# Patient Record
Sex: MALE | Race: WHITE | Employment: UNEMPLOYED | URBAN - METROPOLITAN AREA
[De-identification: names, ages, dates, MRNs, and addresses within clinical notes are randomized per-mention and may not be internally consistent; named-entity substitution may affect disease eponyms.]

---

## 2020-08-01 ENCOUNTER — APPOINTMENT (OUTPATIENT)
Dept: CT IMAGING | Age: 20
End: 2020-08-01
Payer: COMMERCIAL

## 2020-08-01 ENCOUNTER — HOSPITAL ENCOUNTER (EMERGENCY)
Age: 20
Discharge: HOME | End: 2020-08-01
Payer: COMMERCIAL

## 2020-08-01 VITALS
OXYGEN SATURATION: 100 % | HEART RATE: 95 BPM | HEIGHT: 70 IN | BODY MASS INDEX: 26.48 KG/M2 | RESPIRATION RATE: 17 BRPM | WEIGHT: 185 LBS | SYSTOLIC BLOOD PRESSURE: 139 MMHG | TEMPERATURE: 98.3 F | DIASTOLIC BLOOD PRESSURE: 96 MMHG

## 2020-08-01 PROCEDURE — 70450 CT HEAD/BRAIN W/O DYE: CPT

## 2020-08-01 PROCEDURE — 70480 CT ORBIT/EAR/FOSSA W/O DYE: CPT

## 2020-08-01 PROCEDURE — 12013 RPR F/E/E/N/L/M 2.6-5.0 CM: CPT

## 2020-08-01 PROCEDURE — 99283 EMERGENCY DEPT VISIT LOW MDM: CPT

## 2020-08-01 PROCEDURE — 90471 IMMUNIZATION ADMIN: CPT | Performed by: PHYSICIAN ASSISTANT

## 2020-08-01 PROCEDURE — 90715 TDAP VACCINE 7 YRS/> IM: CPT | Performed by: PHYSICIAN ASSISTANT

## 2020-08-01 PROCEDURE — 6360000002 HC RX W HCPCS: Performed by: PHYSICIAN ASSISTANT

## 2020-08-01 RX ORDER — LIDOCAINE HYDROCHLORIDE AND EPINEPHRINE BITARTRATE 20; .01 MG/ML; MG/ML
20 INJECTION, SOLUTION SUBCUTANEOUS ONCE
Status: DISCONTINUED | OUTPATIENT
Start: 2020-08-01 | End: 2020-08-01 | Stop reason: HOSPADM

## 2020-08-01 RX ORDER — HYDROCODONE BITARTRATE AND ACETAMINOPHEN 5; 325 MG/1; MG/1
1 TABLET ORAL EVERY 6 HOURS PRN
Qty: 15 TABLET | Refills: 0 | Status: SHIPPED | OUTPATIENT
Start: 2020-08-01 | End: 2020-08-04

## 2020-08-01 RX ORDER — AMOXICILLIN AND CLAVULANATE POTASSIUM 875; 125 MG/1; MG/1
1 TABLET, FILM COATED ORAL 2 TIMES DAILY
Qty: 20 TABLET | Refills: 0 | Status: SHIPPED | OUTPATIENT
Start: 2020-08-01 | End: 2020-08-11

## 2020-08-01 RX ADMIN — TETANUS TOXOID, REDUCED DIPHTHERIA TOXOID AND ACELLULAR PERTUSSIS VACCINE, ADSORBED 0.5 ML: 5; 2.5; 8; 8; 2.5 SUSPENSION INTRAMUSCULAR at 20:33

## 2020-08-01 ASSESSMENT — ENCOUNTER SYMPTOMS
NAUSEA: 0
ABDOMINAL PAIN: 0
VOMITING: 0
FACIAL SWELLING: 1
EYE PAIN: 0
CHEST TIGHTNESS: 0
SHORTNESS OF BREATH: 0
COLOR CHANGE: 1
DIARRHEA: 0

## 2020-08-01 ASSESSMENT — VISUAL ACUITY: OU: 1

## 2020-08-01 ASSESSMENT — PAIN DESCRIPTION - PAIN TYPE: TYPE: ACUTE PAIN

## 2020-08-01 ASSESSMENT — PAIN SCALES - GENERAL: PAINLEVEL_OUTOF10: 6

## 2020-08-01 NOTE — ED PROVIDER NOTES
Laceration present. No raccoon eyes, Dawson's sign or contusion. Jaw: There is normal jaw occlusion. Right Ear: Hearing, tympanic membrane, ear canal and external ear normal. No hemotympanum. Left Ear: Hearing, tympanic membrane, ear canal and external ear normal. No hemotympanum. Nose: Nose normal.      Mouth/Throat:      Lips: Pink. Mouth: Mucous membranes are moist.   Eyes:      General: Vision grossly intact. Gaze aligned appropriately. No scleral icterus. Right eye: No discharge. Left eye: No discharge. Extraocular Movements: Extraocular movements intact. Right eye: Normal extraocular motion and no nystagmus. Left eye: Normal extraocular motion and no nystagmus. Conjunctiva/sclera: Conjunctivae normal.      Left eye: Left conjunctiva is not injected. No chemosis, exudate or hemorrhage. Pupils: Pupils are equal, round, and reactive to light. Neck:      Musculoskeletal: Normal range of motion. Vascular: No JVD. Cardiovascular:      Rate and Rhythm: Normal rate and regular rhythm. Heart sounds: No murmur. No friction rub. No gallop. Pulmonary:      Effort: Pulmonary effort is normal. No accessory muscle usage or respiratory distress. Breath sounds: Normal breath sounds. No wheezing, rhonchi or rales. Abdominal:      General: There is no distension. Palpations: Abdomen is soft. Abdomen is not rigid. There is no mass. Tenderness: There is no abdominal tenderness. There is no guarding or rebound. Skin:     General: Skin is warm and dry. Neurological:      Mental Status: He is alert and oriented to person, place, and time. GCS: GCS eye subscore is 4. GCS verbal subscore is 5. GCS motor subscore is 6. Cranial Nerves: No cranial nerve deficit. Sensory: No sensory deficit. Coordination: Coordination normal.   Psychiatric:         Behavior: Behavior normal. Behavior is cooperative.          DIAGNOSTIC RESULTS   LABS:    Labs Reviewed - No data to display    All other labs were within normal range or not returned as of this dictation. EKG: All EKG's are interpreted by the Emergency Department Physician in the absence of a cardiologist.  Please see their note for interpretation of EKG. RADIOLOGY:   Non-plain film images such as CT, Ultrasound and MRI are read by the radiologist. Plain radiographic images are visualized and preliminarily interpreted by the ED Provider with the below findings:        Interpretation per the Radiologist below, if available at the time of this note:    CT HEAD WO CONTRAST   Final Result   1. No acute intracranial abnormality. 2. There is suggestion of a nondepressed fracture involving the left orbital   floor given minimal soft tissue emphysema within the left inferior extraconal   region as well as blood layering within the left maxillary sinus. 3. Mild-to-moderate left perioral soft tissue swelling with a laceration   along the left frontal scalp. CT ORBITS WO CONTRAST   Final Result   1. No acute intracranial abnormality. 2. There is suggestion of a nondepressed fracture involving the left orbital   floor given minimal soft tissue emphysema within the left inferior extraconal   region as well as blood layering within the left maxillary sinus. 3. Mild-to-moderate left perioral soft tissue swelling with a laceration   along the left frontal scalp. PROCEDURES   Unless otherwise noted below, none     Lac Repair    Date/Time: 8/1/2020 8:04 PM  Performed by: Stacey Kitchen PA-C  Authorized by: Stacey Kitchen PA-C     Consent:     Consent obtained:  Verbal    Consent given by:  Patient    Risks discussed:  Infection, pain, poor cosmetic result, poor wound healing and retained foreign body    Alternatives discussed:  No treatment  Anesthesia (see MAR for exact dosages):      Anesthesia method:  Local infiltration    Local anesthetic:  Lidocaine 2% w/o epi  Laceration details:     Location:  Face    Face location:  L eyebrow    Length (cm):  4  Repair type:     Repair type: Intermediate  Treatment:     Area cleansed with:  Hibiclens    Amount of cleaning:  Standard  Subcutaneous repair:     Suture size:  5-0    Suture material:  Vicryl    Suture technique:  Simple interrupted    Number of sutures:  2  Skin repair:     Repair method:  Sutures    Suture size:  5-0    Suture material:  Nylon    Suture technique:  Simple interrupted    Number of sutures:  8  Approximation:     Approximation:  Close  Post-procedure details:     Dressing:  Antibiotic ointment    Patient tolerance of procedure: Tolerated well, no immediate complications  Ortho Injury    Date/Time: 8/1/2020 8:05 PM  Performed by: Pauline Whittington PA-C  Authorized by: Pauline Whittington PA-C   Consent: Verbal consent obtained. Injury location: head  Location details: left orbit  Injury type: fracture  Pre-procedure neurovascular assessment: neurovascularly intact    Anesthesia:  Local anesthesia used: no  Comments: Nondisplaced left orbital floor fracture with associated laceration please see separate laceration repair procedure. CRITICAL CARE TIME   N/A    CONSULTS:  None      EMERGENCY DEPARTMENT COURSE and DIFFERENTIAL DIAGNOSIS/MDM:   Vitals:    Vitals:    08/01/20 1803   BP: (!) 139/96   Pulse: 95   Resp: 17   Temp: 98.3 °F (36.8 °C)   TempSrc: Oral   SpO2: 100%   Weight: 185 lb (83.9 kg)   Height: 5' 10\" (1.778 m)       Patient was given the following medications:  Medications   lidocaine-EPINEPHrine 2 percent-1:[id number] injection 20 mL (has no administration in time range)   Tetanus-Diphth-Acell Pertussis (BOOSTRIX) injection 0.5 mL (has no administration in time range)           The patient's detailed history of present illness is documented as above. Upon arrival to the emergency department the patient's vital signs are as documented.  The patient is noted to be hemodynamically stable and afebrile. Physical examination findings are as above. CT the head as well as facial bones are completed as above. There is no evidence of acute intracranial abnormality. There is a nondisplaced fracture of the left orbital floor with minimal associated soft and shoe emphysema and layering noted within the left maxillary sinus. Periorbital soft tissue swelling with associated laceration noted left upper eyebrow. Wound was repaired as above. Tetanus vaccination completed as above. Images were pushed to the [facility]. Arrangements for follow-up for oral maxillofacial on an outpatient basis were discussed. Sinus precautions, medication for pain, all as documented. The patient has been made aware of the signs and symptoms which would necessitate an immediate return to the emergency department and verbalizes an understanding of these signs and symptoms. I estimate there is a low risk of herpes keratitis or UV keratitis, corneal ulceration or angle closure glaucoma. There is no evidence of conjunctivitis, scleritis, or episcleritis. No vision threatening condition found such as penetrating globe injury, central retinal artery occlusion, rentinal vein thrombosis or retinal detachment. In light of a clinical presentation which lacks an opthamalogic emergency, the patient be managed on an outpatient basis. The patient/family and I have discussed the diagnosis and risks, and we agree with discharging home with referral to ophthalmology     I estimate there is low risk for intracranial hemorrhage or edema, subdural or epidural hematoma, cauda equina or central cord syndrome, cord compression, compartment syndrome, tendon or neurovascular injury, pneumothorax, hemothorax, pericardial tamponade, cardiac contusion, thoracic aortic dissection, intra-abdominal injury or perforated bowel, thus I consider the discharge disposition reasonable. FINAL IMPRESSION      1.  Closed fracture of left orbital